# Patient Record
Sex: MALE | Race: OTHER | URBAN - METROPOLITAN AREA
[De-identification: names, ages, dates, MRNs, and addresses within clinical notes are randomized per-mention and may not be internally consistent; named-entity substitution may affect disease eponyms.]

---

## 2022-03-19 PROBLEM — E78.00 HYPERCHOLESTEREMIA: Status: ACTIVE | Noted: 2020-02-28

## 2022-03-19 PROBLEM — K64.9 HEMORRHOIDS: Status: ACTIVE | Noted: 2019-08-05

## 2022-09-26 ENCOUNTER — OFFICE VISIT (OUTPATIENT)
Dept: UROLOGY | Age: 67
End: 2022-09-26
Payer: COMMERCIAL

## 2022-09-26 DIAGNOSIS — R97.20 ELEVATED PSA: Primary | ICD-10-CM

## 2022-09-26 DIAGNOSIS — N13.8 BPH WITH OBSTRUCTION/LOWER URINARY TRACT SYMPTOMS: ICD-10-CM

## 2022-09-26 DIAGNOSIS — N40.1 BPH WITH OBSTRUCTION/LOWER URINARY TRACT SYMPTOMS: ICD-10-CM

## 2022-09-26 LAB
BILIRUBIN, URINE, POC: NEGATIVE
BLOOD URINE, POC: ABNORMAL
GLUCOSE URINE, POC: NEGATIVE
KETONES, URINE, POC: NEGATIVE
LEUKOCYTE ESTERASE, URINE, POC: NEGATIVE
NITRITE, URINE, POC: ABNORMAL
PH, URINE, POC: 7 (ref 4.6–8)
PROTEIN,URINE, POC: NEGATIVE
SPECIFIC GRAVITY, URINE, POC: 1.02 (ref 1–1.03)
URINALYSIS CLARITY, POC: CLEAR
URINALYSIS COLOR, POC: YELLOW
UROBILINOGEN, POC: 0.2

## 2022-09-26 PROCEDURE — 99204 OFFICE O/P NEW MOD 45 MIN: CPT | Performed by: UROLOGY

## 2022-09-26 PROCEDURE — 81003 URINALYSIS AUTO W/O SCOPE: CPT | Performed by: UROLOGY

## 2022-09-26 PROCEDURE — 1123F ACP DISCUSS/DSCN MKR DOCD: CPT | Performed by: UROLOGY

## 2022-09-26 RX ORDER — TAMSULOSIN HYDROCHLORIDE 0.4 MG/1
0.4 CAPSULE ORAL DAILY
Qty: 30 CAPSULE | Refills: 5 | Status: SHIPPED | OUTPATIENT
Start: 2022-09-26

## 2022-09-26 ASSESSMENT — ENCOUNTER SYMPTOMS
ABDOMINAL PAIN: 0
DIARRHEA: 0
EYE PAIN: 0
BACK PAIN: 0
CONSTIPATION: 0
EYE DISCHARGE: 0
BLOOD IN STOOL: 0
VOMITING: 0
WHEEZING: 0
INDIGESTION: 0
COUGH: 0
NAUSEA: 0
SHORTNESS OF BREATH: 0
SKIN LESIONS: 0
HEARTBURN: 0

## 2022-09-26 NOTE — PROGRESS NOTES
[facility]  [address]  [address] [facility], [address] [facility]  [phone]    Gabrielle Lunsford  : 1955    Chief Complaint   Patient presents with    Elevated PSA    New Patient        HPI     Gabrielle Lunsford is a 74 y.o. male referred by Dr. Emile Shrestha for evaluation and treatment of elevated PSA and BPH/LUTS. He c/ o occasional pain in the end of his penis when voiding . when he has BM, he also has the urge to urinate. PSA 4.74 in 3-22. He doesn't recall having had a previous PSA level. No family hx of prostate cancer. Has slow stream, straining and urgency. Past Medical History:   Diagnosis Date    Hypercholesterolemia     several statins have caused muscle pains    Melanoma ([address]     right knee     Past Surgical History:   Procedure Laterality Date    OTHER SURGICAL HISTORY      removal of melanomia on right knee     Current Outpatient Medications   Medication Sig Dispense Refill    tamsulosin (FLOMAX) 0.4 MG capsule Take 1 capsule by mouth daily 30 capsule 5    simvastatin (ZOCOR) 10 MG tablet Take 10 mg by mouth       No current facility-administered medications for this visit.      Allergies   Allergen Reactions    Rosuvastatin Other (See Comments)     Bone & muscle pain     Social History     Socioeconomic History    Marital status: Unknown     Spouse name: Not on file    Number of children: Not on file    Years of education: Not on file    Highest education level: Not on file   Occupational History    Not on file   Tobacco Use    Smoking status: Never    Smokeless tobacco: Never   Substance and Sexual Activity    Alcohol use: Yes    Drug use: Never    Sexual activity: Not on file   Other Topics Concern    Not on file   Social History Narrative    Not on file     Social Determinants of Health     Financial Resource Strain: Not on file   Food Insecurity: Not on file   Transportation Needs: Not on file   Physical Activity: Not on file   Stress: Not on file   Social Urobilinogen, POC 0.2     Nitrate, Urine, POC neg Negative    Leukocyte Esterase, Urine, POC Negative Negative       UA - Micro  WBC - 0  RBC - 0  Bacteria - 0  Epith - 0    Assessment and Plan    Laquita Lopez was seen today for elevated psa and new patient. Diagnoses and all orders for this visit:    Elevated PSA  -     AMB POC URINALYSIS DIP STICK AUTO W/O MICRO  -     PSA, Total and Free; Future    BPH with obstruction/lower urinary tract symptoms  -     tamsulosin (FLOMAX) 0.4 MG capsule; Take 1 capsule by mouth daily     Discussed medical and surgical options for BPH. Gave brochure about rezum. Start tamsulosin, discussed possible side effects. Follow-up and Dispositions    Return in about 3 months (around 12/26/2022) for PSA II, appt, PSA before.

## 2023-02-08 ENCOUNTER — OFFICE VISIT (OUTPATIENT)
Dept: UROLOGY | Age: 68
End: 2023-02-08

## 2023-02-08 DIAGNOSIS — N40.1 BPH WITH OBSTRUCTION/LOWER URINARY TRACT SYMPTOMS: ICD-10-CM

## 2023-02-08 DIAGNOSIS — N13.8 BPH WITH OBSTRUCTION/LOWER URINARY TRACT SYMPTOMS: ICD-10-CM

## 2023-02-08 DIAGNOSIS — R97.20 ELEVATED PSA: Primary | ICD-10-CM

## 2023-02-08 LAB
BILIRUBIN, URINE, POC: NEGATIVE
BLOOD URINE, POC: NORMAL
GLUCOSE URINE, POC: NEGATIVE
KETONES, URINE, POC: NEGATIVE
LEUKOCYTE ESTERASE, URINE, POC: NEGATIVE
NITRITE, URINE, POC: NORMAL
PH, URINE, POC: 7 (ref 4.6–8)
PROTEIN,URINE, POC: NEGATIVE
SPECIFIC GRAVITY, URINE, POC: 1.02 (ref 1–1.03)
URINALYSIS CLARITY, POC: NORMAL
URINALYSIS COLOR, POC: YELLOW
UROBILINOGEN, POC: 1

## 2023-02-08 NOTE — PROGRESS NOTES
[facility]  [address] [address]  [phone]    Elier Odell  : 1955    Chief Complaint   Patient presents with    Elevated PSA        HPI     Elier Odell is a 74 y.o. male referred by Dr. Irma Hernandez for evaluation and treatment of elevated PSA and BPH/LUTS. He c/ o occasional pain in the end of his penis when voiding . when he has BM, he also has the urge to urinate. PSA 4.74 in 3-22. He doesn't recall having had a previous PSA level. No family hx of prostate cancer. Had a negative prostate biopsy in 2016 in [place]. Has slow stream, straining and urgency. JONI in : Prostate - slightly enlarged, no nodules  Started tamsulosin in . The stream is better, but still having urinary and fecal urgency in the AM.   Past Medical History:   Diagnosis Date    Hypercholesterolemia     several statins have caused muscle pains    Melanoma ([address]     right knee     Past Surgical History:   Procedure Laterality Date    OTHER SURGICAL HISTORY      removal of melanomia on right knee     Current Outpatient Medications   Medication Sig Dispense Refill    tamsulosin (FLOMAX) 0.4 MG capsule Take 1 capsule by mouth daily 30 capsule 5    simvastatin (ZOCOR) 10 MG tablet Take 10 mg by mouth       No current facility-administered medications for this visit.      Allergies   Allergen Reactions    Rosuvastatin Other (See Comments)     Bone & muscle pain     Social History     Socioeconomic History    Marital status: Unknown     Spouse name: Not on file    Number of children: Not on file    Years of education: Not on file    Highest education level: Not on file   Occupational History    Not on file   Tobacco Use    Smoking status: Never    Smokeless tobacco: Never   Substance and Sexual Activity    Alcohol use: Yes    Drug use: Never    Sexual activity: Not on file   Other Topics Concern    Not on file   Social History Narrative    Not on file     Social Determinants of Health     Financial Resource Strain: Not on file   Food Insecurity: Not on file   Transportation Needs: Not on file   Physical Activity: Not on file   Stress: Not on file   Social Connections: Not on file   Intimate Partner Violence: Not on file   Housing Stability: Not on file     Family History   Problem Relation Age of Onset    Cancer Mother         colon     Hypertension Mother     Elevated Lipids Mother     Heart Disease Brother        ROS    There were no vitals taken for this visit. Urinalysis  UA - Dipstick  Results for orders placed or performed in visit on 02/08/23   AMB POC URINALYSIS DIP STICK AUTO W/O MICRO   Result Value Ref Range    Color, Urine, POC yellow     Clarity, Urine, POC peace     Glucose, Urine, POC Negative Negative    Bilirubin, Urine, POC Negative Negative    Ketones, Urine, POC Negative Negative    Specific Gravity, Urine, POC 1.020 1.001 - 1.035    Blood, Urine, POC neg Negative    pH, Urine, POC 7.0 4.6 - 8.0    Protein, Urine, POC Negative Negative    Urobilinogen, POC 1.0     Nitrate, Urine, POC neg Negative    Leukocyte Esterase, Urine, POC Negative Negative       UA - Micro  WBC - 0  RBC - 0  Bacteria - 0  Epith - 0    Physical Exam    Assessment and Plan  Dorothy Pablo was seen today for elevated psa. Diagnoses and all orders for this visit:    Elevated PSA  -     AMB POC URINALYSIS DIP STICK AUTO W/O MICRO  -     PSA, Total and Free; Future    BPH with obstruction/lower urinary tract symptoms  -     AMB POC URINALYSIS DIP STICK AUTO W/O MICRO       Follow-up and Dispositions    Return in 7 weeks (on 3/29/2023) for PVR on return, cysto, PSA II. IPSS then.  Continue tamsulosin

## 2023-03-22 DIAGNOSIS — R97.20 ELEVATED PSA: ICD-10-CM

## 2023-03-23 LAB
PSA FREE MFR SERPL: 14.3 %
PSA FREE SERPL-MCNC: 1 NG/ML
PSA SERPL-MCNC: 7 NG/ML

## 2023-03-29 ENCOUNTER — PROCEDURE VISIT (OUTPATIENT)
Dept: UROLOGY | Age: 68
End: 2023-03-29
Payer: COMMERCIAL

## 2023-03-29 DIAGNOSIS — N13.8 BPH WITH OBSTRUCTION/LOWER URINARY TRACT SYMPTOMS: ICD-10-CM

## 2023-03-29 DIAGNOSIS — R97.20 ELEVATED PSA: Primary | ICD-10-CM

## 2023-03-29 DIAGNOSIS — R39.14 FEELING OF INCOMPLETE BLADDER EMPTYING: ICD-10-CM

## 2023-03-29 DIAGNOSIS — N40.1 BPH WITH OBSTRUCTION/LOWER URINARY TRACT SYMPTOMS: ICD-10-CM

## 2023-03-29 LAB
BILIRUBIN, URINE, POC: NEGATIVE
BLOOD URINE, POC: NORMAL
GLUCOSE URINE, POC: NEGATIVE
KETONES, URINE, POC: NEGATIVE
LEUKOCYTE ESTERASE, URINE, POC: NEGATIVE
NITRITE, URINE, POC: NEGATIVE
PH, URINE, POC: 7.5 (ref 4.6–8)
PROTEIN,URINE, POC: NEGATIVE
PVR, POC: NORMAL CC
SPECIFIC GRAVITY, URINE, POC: 1.01 (ref 1–1.03)
URINALYSIS CLARITY, POC: NORMAL
URINALYSIS COLOR, POC: NORMAL
UROBILINOGEN, POC: NORMAL

## 2023-03-29 PROCEDURE — 1123F ACP DISCUSS/DSCN MKR DOCD: CPT | Performed by: UROLOGY

## 2023-03-29 PROCEDURE — 99213 OFFICE O/P EST LOW 20 MIN: CPT | Performed by: UROLOGY

## 2023-03-29 PROCEDURE — 51798 US URINE CAPACITY MEASURE: CPT | Performed by: UROLOGY

## 2023-03-29 PROCEDURE — 81003 URINALYSIS AUTO W/O SCOPE: CPT | Performed by: UROLOGY

## 2023-03-29 PROCEDURE — 52000 CYSTOURETHROSCOPY: CPT | Performed by: UROLOGY

## 2023-03-29 RX ORDER — TAMSULOSIN HYDROCHLORIDE 0.4 MG/1
0.4 CAPSULE ORAL DAILY
Qty: 60 CAPSULE | Refills: 12 | Status: SHIPPED | OUTPATIENT
Start: 2023-03-29

## 2023-03-29 RX ORDER — TAMSULOSIN HYDROCHLORIDE 0.4 MG/1
0.8 CAPSULE ORAL DAILY
Qty: 60 CAPSULE | Refills: 12 | Status: SHIPPED | OUTPATIENT
Start: 2023-03-29

## 2023-03-29 NOTE — PROGRESS NOTES
[facility]  [address] [place] [address], [address]  [phone]    Mitra Vega  : 1955         HPI   74 y.o., male  referred by Dr. Ozzie Wayne for evaluation and treatment of elevated PSA and BPH/LUTS. He c/ o occasional pain in the end of his penis when voiding . when he has BM, he also has the urge to urinate. PSA 4.74 in 3-22. He doesn't recall having had a previous PSA level. No family hx of prostate cancer. Had a negative prostate biopsy in 2016 in [place]. Has slow stream, straining and urgency. JONI in : Prostate - slightly enlarged, no nodules  Started tamsulosin in . The stream is better, but still having urinary and fecal urgency in the AM.   In 3-23 total PSA 7.0 with 14.3% free PSA. Here for cysto. Past Medical History:   Diagnosis Date    Hypercholesterolemia     several statins have caused muscle pains    Melanoma ([address] [facility] [address] 2011    right knee     Past Surgical History:   Procedure Laterality Date    OTHER SURGICAL HISTORY      removal of melanomia on right knee     Current Outpatient Medications   Medication Sig Dispense Refill    tamsulosin (FLOMAX) 0.4 MG capsule Take 2 capsules by mouth daily 60 capsule 12    tamsulosin (FLOMAX) 0.4 MG capsule Take 1 capsule by mouth daily 60 capsule 12    simvastatin (ZOCOR) 10 MG tablet Take 10 mg by mouth       No current facility-administered medications for this visit.      Allergies   Allergen Reactions    Rosuvastatin Other (See Comments)     Bone & muscle pain     Social History     Socioeconomic History    Marital status: Unknown     Spouse name: Not on file    Number of children: Not on file    Years of education: Not on file    Highest education level: Not on file   Occupational History    Not on file   Tobacco Use    Smoking status: Never    Smokeless tobacco: Never   Substance and Sexual Activity    Alcohol use: Yes    Drug use: Never    Sexual activity: Not on file   Other Topics Concern    Not on

## 2023-04-13 ENCOUNTER — HOSPITAL ENCOUNTER (OUTPATIENT)
Dept: MRI IMAGING | Age: 68
Discharge: HOME | End: 2023-04-16

## 2023-04-13 DIAGNOSIS — R97.20 ELEVATED PSA: ICD-10-CM

## 2023-04-21 ENCOUNTER — TELEPHONE (OUTPATIENT)
Dept: UROLOGY | Age: 68
End: 2023-04-21

## 2023-05-12 ENCOUNTER — OFFICE VISIT (OUTPATIENT)
Dept: UROLOGY | Age: 68
End: 2023-05-12
Payer: COMMERCIAL

## 2023-05-12 DIAGNOSIS — N13.8 BPH WITH OBSTRUCTION/LOWER URINARY TRACT SYMPTOMS: ICD-10-CM

## 2023-05-12 DIAGNOSIS — N40.1 BPH WITH OBSTRUCTION/LOWER URINARY TRACT SYMPTOMS: ICD-10-CM

## 2023-05-12 DIAGNOSIS — R97.20 ELEVATED PSA: Primary | ICD-10-CM

## 2023-05-12 PROCEDURE — 99214 OFFICE O/P EST MOD 30 MIN: CPT | Performed by: UROLOGY

## 2023-05-12 PROCEDURE — 1123F ACP DISCUSS/DSCN MKR DOCD: CPT | Performed by: UROLOGY

## 2023-05-12 RX ORDER — FINASTERIDE 5 MG/1
5 TABLET, FILM COATED ORAL DAILY
Qty: 30 TABLET | Refills: 11 | Status: SHIPPED | OUTPATIENT
Start: 2023-05-12

## 2023-05-12 ASSESSMENT — ENCOUNTER SYMPTOMS
NAUSEA: 0
BACK PAIN: 0

## 2023-05-12 NOTE — PROGRESS NOTES
[facility]  [address]  [address] [facility], [address] [facility]  [phone]    Zeenat Kuhn  : 1955    Chief Complaint   Patient presents with    Follow-up        HPI     Zeenat Kuhn is a 74 y.o. male with elevated PSA and BPH/LUTS. He c/ o occasional pain in the end of his penis when voiding . when he has BM, he also has the urge to urinate. PSA 4.74 in 3-22. He doesn't recall having had a previous PSA level. No family hx of prostate cancer. Had a negative prostate biopsy in 2016 in [place]. Has slow stream, straining and urgency. JONI in : Prostate - slightly enlarged, no nodules  Started tamsulosin in . The stream is better, but still having urinary and fecal urgency in the AM.   In 3-23 total PSA 7.0 with 14.3% free PSA. Cysto 3-29-23: Prostatic urethra:  trilobar prostatic hyperplasia, without bladder neck contracture. Large posterior intravesical prostate lobe. Fossa length 6 cm. Bladder: 3+ trabeculation, without lesions. small tic at dome.  ml. IPSS is 15, highest score of 4 for frequency and urgency. QOL is unhappy. He increased tamsulosin to 0.8 mg daily. This made him fatigued and he went back to 0.4 mg/day. PSA has increased significantly. MRI 23: Findings:  The prostate gland measures: 5.7 cm transverse by 5.5 cm AP by 5.8 cm  craniocaudal. The most recent PSA level at this institution is dated 3/22/2023  measured at 7 ng/mL. This calculates to a PSA density of 0.074 ng/mL/cc. Mone Redder Peripheral Zone: Ill-defined and streaky T2 signal loss is seen nearly  throughout the bilateral prostate gland peripheral zones. However, these  demonstrate, at most, only mild associated diffusion-weighted imaging signal  changes and no clear early focal enhancement. Diffusion-weighted imaging signal  changes appear most pronounced at the bilateral medial mid gland peripheral zone  on diffusion-weighted image 62, and ADC map image 14.  These

## 2023-11-09 DIAGNOSIS — R97.20 ELEVATED PSA: ICD-10-CM

## 2023-11-10 LAB — PSA SERPL-MCNC: 4.4 NG/ML

## 2023-11-15 ENCOUNTER — OFFICE VISIT (OUTPATIENT)
Dept: UROLOGY | Age: 68
End: 2023-11-15
Payer: MEDICARE

## 2023-11-15 DIAGNOSIS — R97.20 ELEVATED PSA: Primary | ICD-10-CM

## 2023-11-15 DIAGNOSIS — N13.8 BPH WITH OBSTRUCTION/LOWER URINARY TRACT SYMPTOMS: ICD-10-CM

## 2023-11-15 DIAGNOSIS — N40.1 BPH WITH OBSTRUCTION/LOWER URINARY TRACT SYMPTOMS: ICD-10-CM

## 2023-11-15 LAB
BILIRUBIN, URINE, POC: NEGATIVE
BLOOD URINE, POC: NEGATIVE
GLUCOSE URINE, POC: NEGATIVE
KETONES, URINE, POC: NEGATIVE
LEUKOCYTE ESTERASE, URINE, POC: NEGATIVE
NITRITE, URINE, POC: NEGATIVE
PH, URINE, POC: 7.5 (ref 4.6–8)
PROTEIN,URINE, POC: NEGATIVE
SPECIFIC GRAVITY, URINE, POC: 1.01 (ref 1–1.03)
URINALYSIS CLARITY, POC: NORMAL
URINALYSIS COLOR, POC: NORMAL
UROBILINOGEN, POC: NORMAL

## 2023-11-15 PROCEDURE — G8427 DOCREV CUR MEDS BY ELIG CLIN: HCPCS | Performed by: UROLOGY

## 2023-11-15 PROCEDURE — 1123F ACP DISCUSS/DSCN MKR DOCD: CPT | Performed by: UROLOGY

## 2023-11-15 PROCEDURE — 81003 URINALYSIS AUTO W/O SCOPE: CPT | Performed by: UROLOGY

## 2023-11-15 PROCEDURE — 4004F PT TOBACCO SCREEN RCVD TLK: CPT | Performed by: UROLOGY

## 2023-11-15 PROCEDURE — G8419 CALC BMI OUT NRM PARAM NOF/U: HCPCS | Performed by: UROLOGY

## 2023-11-15 PROCEDURE — 99214 OFFICE O/P EST MOD 30 MIN: CPT | Performed by: UROLOGY

## 2023-11-15 PROCEDURE — 3017F COLORECTAL CA SCREEN DOC REV: CPT | Performed by: UROLOGY

## 2023-11-15 PROCEDURE — G8484 FLU IMMUNIZE NO ADMIN: HCPCS | Performed by: UROLOGY

## 2023-11-15 ASSESSMENT — ENCOUNTER SYMPTOMS
NAUSEA: 0
BACK PAIN: 0

## 2023-11-15 NOTE — PROGRESS NOTES
[facility]  [address] [address]  [address] [place] [address]  : 1955    Chief Complaint   Patient presents with    Follow-up        HPI     Nichelle Coyle is a 79 y.o. male with elevated PSA and BPH/LUTS. 90 gm prostate. PSA 4.74 in 3-22. He doesn't recall having had a previous PSA level. No family hx of prostate cancer. Had a negative prostate biopsy in 2016 in [place]. Has slow stream, straining and urgency. JONI in : Prostate - slightly enlarged, no nodules  Started tamsulosin in . The stream is better, but still having urinary and fecal urgency in the AM.   In 3-23 total PSA 7.0 with 14.3% free PSA. Cysto 3-29-23: Prostatic urethra:  trilobar prostatic hyperplasia, without bladder neck contracture. Large posterior intravesical prostate lobe. Fossa length 6 cm. Bladder: 3+ trabeculation, without lesions. small tic at dome.  ml. IPSS is 15, highest score of 4 for frequency and urgency. QOL is unhappy. He increased tamsulosin to 0.8 mg daily. This made him fatigued and he went back to 0.4 mg/day. PSA has increased significantly. MRI 23: Findings:  The prostate gland measures: 5.7 cm transverse by 5.5 cm AP by 5.8 cm  craniocaudal. The most recent PSA level at this institution is dated 3/22/2023  measured at 7 ng/mL. This calculates to a PSA density of 0.074 ng/mL/cc. Roverto Running Peripheral Zone: Ill-defined and streaky T2 signal loss is seen nearly  throughout the bilateral prostate gland peripheral zones. However, these  demonstrate, at most, only mild associated diffusion-weighted imaging signal  changes and no clear early focal enhancement. Diffusion-weighted imaging signal  changes appear most pronounced at the bilateral medial mid gland peripheral zone  on diffusion-weighted image 62, and ADC map image 14.  These correspond to  streaky ill-defined T2 signal loss seen on axial image 15 and no early focal  enhancement is

## 2024-02-01 ENCOUNTER — TELEPHONE (OUTPATIENT)
Dept: UROLOGY | Age: 69
End: 2024-02-01

## 2024-02-15 DIAGNOSIS — N13.8 BPH WITH OBSTRUCTION/LOWER URINARY TRACT SYMPTOMS: ICD-10-CM

## 2024-02-15 DIAGNOSIS — N40.1 BPH WITH OBSTRUCTION/LOWER URINARY TRACT SYMPTOMS: ICD-10-CM

## 2024-02-15 RX ORDER — TAMSULOSIN HYDROCHLORIDE 0.4 MG/1
0.8 CAPSULE ORAL DAILY
Qty: 120 CAPSULE | Refills: 6 | Status: SHIPPED | OUTPATIENT
Start: 2024-02-15

## 2024-03-06 ENCOUNTER — TELEPHONE (OUTPATIENT)
Dept: UROLOGY | Age: 69
End: 2024-03-06

## 2024-03-06 NOTE — TELEPHONE ENCOUNTER
----- Message from Rubén Regan Jr., MD sent at 11/15/2023  2:50 PM EST -----  Set up for [facility].

## 2025-03-02 DIAGNOSIS — N13.8 BPH WITH OBSTRUCTION/LOWER URINARY TRACT SYMPTOMS: ICD-10-CM

## 2025-03-02 DIAGNOSIS — N40.1 BPH WITH OBSTRUCTION/LOWER URINARY TRACT SYMPTOMS: ICD-10-CM

## 2025-03-03 RX ORDER — TAMSULOSIN HYDROCHLORIDE 0.4 MG/1
0.8 CAPSULE ORAL DAILY
Qty: 120 CAPSULE | Refills: 6 | OUTPATIENT
Start: 2025-03-03

## 2025-07-14 ENCOUNTER — TELEPHONE (OUTPATIENT)
Dept: UROLOGY | Age: 70
End: 2025-07-14

## 2025-07-14 DIAGNOSIS — N13.8 BPH WITH OBSTRUCTION/LOWER URINARY TRACT SYMPTOMS: ICD-10-CM

## 2025-07-14 DIAGNOSIS — N40.1 BPH WITH OBSTRUCTION/LOWER URINARY TRACT SYMPTOMS: ICD-10-CM

## 2025-07-14 RX ORDER — TAMSULOSIN HYDROCHLORIDE 0.4 MG/1
0.8 CAPSULE ORAL DAILY
Qty: 30 CAPSULE | Refills: 0 | Status: SHIPPED | OUTPATIENT
Start: 2025-07-14

## 2025-08-11 ENCOUNTER — PATIENT MESSAGE (OUTPATIENT)
Dept: UROLOGY | Age: 70
End: 2025-08-11

## 2025-08-11 DIAGNOSIS — N40.1 BPH WITH OBSTRUCTION/LOWER URINARY TRACT SYMPTOMS: ICD-10-CM

## 2025-08-11 DIAGNOSIS — N13.8 BPH WITH OBSTRUCTION/LOWER URINARY TRACT SYMPTOMS: ICD-10-CM

## 2025-08-11 RX ORDER — TAMSULOSIN HYDROCHLORIDE 0.4 MG/1
0.8 CAPSULE ORAL DAILY
Qty: 30 CAPSULE | Refills: 0 | Status: SHIPPED | OUTPATIENT
Start: 2025-08-11